# Patient Record
Sex: FEMALE | Race: WHITE | NOT HISPANIC OR LATINO | Employment: UNEMPLOYED | URBAN - METROPOLITAN AREA
[De-identification: names, ages, dates, MRNs, and addresses within clinical notes are randomized per-mention and may not be internally consistent; named-entity substitution may affect disease eponyms.]

---

## 2023-08-09 DIAGNOSIS — K21.9 CHRONIC GASTROESOPHAGEAL REFLUX DISEASE: Primary | ICD-10-CM

## 2023-08-09 PROBLEM — E55.9 VITAMIN D DEFICIENCY: Status: ACTIVE | Noted: 2023-08-09

## 2023-08-09 PROBLEM — N89.8 VAGINAL DRYNESS: Status: ACTIVE | Noted: 2023-08-09

## 2023-08-09 PROBLEM — N92.6 IRREGULAR BLEEDING: Status: ACTIVE | Noted: 2023-08-09

## 2023-08-09 RX ORDER — D-METHORPHAN/PE/ACETAMINOPHEN 10-5-325MG
CAPSULE ORAL
COMMUNITY

## 2023-08-09 RX ORDER — PANTOPRAZOLE SODIUM 20 MG/1
20 TABLET, DELAYED RELEASE ORAL DAILY
Qty: 90 TABLET | Refills: 1 | Status: SHIPPED | OUTPATIENT
Start: 2023-08-09 | End: 2023-08-10

## 2023-08-09 RX ORDER — ERGOCALCIFEROL 1.25 MG/1
1 CAPSULE ORAL WEEKLY
COMMUNITY
Start: 2022-02-10

## 2023-08-09 RX ORDER — PANTOPRAZOLE SODIUM 20 MG/1
1 TABLET, DELAYED RELEASE ORAL DAILY
COMMUNITY
Start: 2017-10-28 | End: 2023-08-09 | Stop reason: SDUPTHER

## 2023-08-09 RX ORDER — MEDROXYPROGESTERONE ACETATE 150 MG/ML
INJECTION, SUSPENSION INTRAMUSCULAR
COMMUNITY
Start: 2017-10-17

## 2023-08-09 RX ORDER — AMOXICILLIN AND CLAVULANATE POTASSIUM 600; 42.9 MG/5ML; MG/5ML
POWDER, FOR SUSPENSION ORAL
COMMUNITY
Start: 2023-03-10

## 2023-08-10 DIAGNOSIS — K21.9 CHRONIC GASTROESOPHAGEAL REFLUX DISEASE: ICD-10-CM

## 2023-08-10 RX ORDER — PANTOPRAZOLE SODIUM 20 MG/1
20 TABLET, DELAYED RELEASE ORAL DAILY
Qty: 90 TABLET | Refills: 1 | Status: SHIPPED | OUTPATIENT
Start: 2023-08-10 | End: 2024-02-08

## 2023-12-18 ENCOUNTER — PROCEDURE VISIT (OUTPATIENT)
Dept: OBSTETRICS AND GYNECOLOGY | Facility: CLINIC | Age: 48
End: 2023-12-18

## 2023-12-18 VITALS
DIASTOLIC BLOOD PRESSURE: 66 MMHG | HEIGHT: 57 IN | BODY MASS INDEX: 15.3 KG/M2 | SYSTOLIC BLOOD PRESSURE: 99 MMHG | WEIGHT: 70.9 LBS

## 2023-12-18 DIAGNOSIS — Z30.42 ENCOUNTER FOR SURVEILLANCE OF INJECTABLE CONTRACEPTIVE: ICD-10-CM

## 2023-12-18 PROCEDURE — 99212 OFFICE O/P EST SF 10 MIN: CPT | Performed by: OBSTETRICS & GYNECOLOGY

## 2023-12-18 PROCEDURE — 96372 THER/PROPH/DIAG INJ SC/IM: CPT | Performed by: OBSTETRICS & GYNECOLOGY

## 2023-12-18 RX ORDER — MEDROXYPROGESTERONE ACETATE 150 MG/ML
150 INJECTION, SUSPENSION INTRAMUSCULAR ONCE
Status: COMPLETED | OUTPATIENT
Start: 2023-12-18 | End: 2023-12-18

## 2023-12-18 RX ADMIN — MEDROXYPROGESTERONE ACETATE 150 MG: 150 INJECTION, SUSPENSION INTRAMUSCULAR at 14:29

## 2024-02-08 DIAGNOSIS — K21.9 CHRONIC GASTROESOPHAGEAL REFLUX DISEASE: ICD-10-CM

## 2024-02-08 RX ORDER — METHYLPREDNISOLONE 4 MG/1
TABLET ORAL
COMMUNITY
Start: 2023-08-20

## 2024-02-08 RX ORDER — PANTOPRAZOLE SODIUM 20 MG/1
20 TABLET, DELAYED RELEASE ORAL DAILY
Qty: 90 TABLET | Refills: 1 | Status: SHIPPED | OUTPATIENT
Start: 2024-02-08

## 2024-02-08 RX ORDER — AZITHROMYCIN 250 MG/1
TABLET, FILM COATED ORAL
COMMUNITY
Start: 2023-08-20

## 2024-03-11 ENCOUNTER — APPOINTMENT (OUTPATIENT)
Dept: OBSTETRICS AND GYNECOLOGY | Facility: CLINIC | Age: 49
End: 2024-03-11

## 2024-03-14 ENCOUNTER — PROCEDURE VISIT (OUTPATIENT)
Dept: OBSTETRICS AND GYNECOLOGY | Facility: CLINIC | Age: 49
End: 2024-03-14

## 2024-03-14 VITALS
BODY MASS INDEX: 14.48 KG/M2 | SYSTOLIC BLOOD PRESSURE: 111 MMHG | HEIGHT: 57 IN | WEIGHT: 67.1 LBS | DIASTOLIC BLOOD PRESSURE: 77 MMHG

## 2024-03-14 DIAGNOSIS — Z30.42 ENCOUNTER FOR SURVEILLANCE OF INJECTABLE CONTRACEPTIVE: ICD-10-CM

## 2024-03-14 PROCEDURE — 96372 THER/PROPH/DIAG INJ SC/IM: CPT | Performed by: OBSTETRICS & GYNECOLOGY

## 2024-03-14 RX ORDER — MEDROXYPROGESTERONE ACETATE 150 MG/ML
150 INJECTION, SUSPENSION INTRAMUSCULAR ONCE
Status: COMPLETED | OUTPATIENT
Start: 2024-03-14 | End: 2024-03-14

## 2024-03-14 RX ADMIN — MEDROXYPROGESTERONE ACETATE 150 MG: 150 INJECTION, SUSPENSION INTRAMUSCULAR at 15:06

## 2024-06-07 ENCOUNTER — APPOINTMENT (OUTPATIENT)
Dept: OBSTETRICS AND GYNECOLOGY | Facility: CLINIC | Age: 49
End: 2024-06-07

## 2024-06-10 ENCOUNTER — PROCEDURE VISIT (OUTPATIENT)
Dept: OBSTETRICS AND GYNECOLOGY | Facility: CLINIC | Age: 49
End: 2024-06-10

## 2024-06-10 VITALS
HEIGHT: 57 IN | SYSTOLIC BLOOD PRESSURE: 114 MMHG | WEIGHT: 63.1 LBS | BODY MASS INDEX: 13.61 KG/M2 | DIASTOLIC BLOOD PRESSURE: 69 MMHG

## 2024-06-10 DIAGNOSIS — Z30.42 ENCOUNTER FOR SURVEILLANCE OF INJECTABLE CONTRACEPTIVE: ICD-10-CM

## 2024-06-10 PROCEDURE — 96372 THER/PROPH/DIAG INJ SC/IM: CPT | Performed by: OBSTETRICS & GYNECOLOGY

## 2024-06-10 RX ORDER — MEDROXYPROGESTERONE ACETATE 150 MG/ML
150 INJECTION, SUSPENSION INTRAMUSCULAR ONCE
Status: COMPLETED | OUTPATIENT
Start: 2024-06-10 | End: 2024-06-10

## 2024-06-10 RX ADMIN — MEDROXYPROGESTERONE ACETATE 150 MG: 150 INJECTION, SUSPENSION INTRAMUSCULAR at 15:20

## 2024-06-10 NOTE — PROGRESS NOTES
Patient here for next depo injection--pt supply  Rt GM was swabbed with alcohol and allowed to dry  Depo was administered IM without complications--pt tolerated well  Next inj due-8/26-9/9/24

## 2024-09-03 ENCOUNTER — APPOINTMENT (OUTPATIENT)
Dept: OBSTETRICS AND GYNECOLOGY | Facility: CLINIC | Age: 49
End: 2024-09-03

## 2024-09-03 VITALS
BODY MASS INDEX: 13.5 KG/M2 | SYSTOLIC BLOOD PRESSURE: 116 MMHG | WEIGHT: 62.6 LBS | HEIGHT: 57 IN | DIASTOLIC BLOOD PRESSURE: 76 MMHG

## 2024-09-03 DIAGNOSIS — N95.2 ATROPHIC VULVOVAGINITIS: ICD-10-CM

## 2024-09-03 DIAGNOSIS — Z01.419 ENCOUNTER FOR ANNUAL ROUTINE GYNECOLOGICAL EXAMINATION: Primary | ICD-10-CM

## 2024-09-03 DIAGNOSIS — Z12.31 ENCOUNTER FOR SCREENING MAMMOGRAM FOR MALIGNANT NEOPLASM OF BREAST: ICD-10-CM

## 2024-09-03 DIAGNOSIS — Z30.42 ENCOUNTER FOR SURVEILLANCE OF INJECTABLE CONTRACEPTIVE: ICD-10-CM

## 2024-09-03 PROCEDURE — 96372 THER/PROPH/DIAG INJ SC/IM: CPT | Performed by: OBSTETRICS & GYNECOLOGY

## 2024-09-03 PROCEDURE — 99396 PREV VISIT EST AGE 40-64: CPT | Performed by: OBSTETRICS & GYNECOLOGY

## 2024-09-03 PROCEDURE — 3008F BODY MASS INDEX DOCD: CPT | Performed by: OBSTETRICS & GYNECOLOGY

## 2024-09-03 RX ORDER — ESTRADIOL 0.1 MG/G
CREAM VAGINAL
Qty: 42 G | Refills: 3 | Status: SHIPPED | OUTPATIENT
Start: 2024-09-03

## 2024-09-03 RX ORDER — MEDROXYPROGESTERONE ACETATE 150 MG/ML
150 INJECTION, SUSPENSION INTRAMUSCULAR ONCE
Status: COMPLETED | OUTPATIENT
Start: 2024-09-03 | End: 2024-09-03

## 2024-09-03 NOTE — PROGRESS NOTES
Subjective   Patient ID: Regina Rodrigez is a 49 y.o. female who presents for Annual Exam (Patient here for annual /Birth control= depo/Pt would like chaperone present for exam-Marlena Medina CMA is present) and Injections.  HPI  49-year-old  2 para 2 whose had 2  sections.  Patient has had amenorrhea x 6 years is currently on Depo-Provera for birth control she admits to regular bowel movements denies any urinary symptoms.  Last normal Pap and HPV 2020.  Patient states she has not had a mammogram since age 37 I have been giving her requisitions but she prefers not to have them at this time.  She said she may try this year.  Does complain of occasional hot flashes and vaginal dryness.  Review of Systems    Objective   Physical Exam  Gen.: Alert and in no acute distress. Well-developed, well-nourished.  Thyroid: Nonenlarged and no palpable thyroid nodules  Cardiovascular: Heart regular rate and rhythm  Pulmonary: Clear bilateral breath sounds  Breasts: Normal appearance, no nipple discharge and no skin changes. No palpable masses and no axillary lymphadenopathy  Abdomen: Soft and nontender, no abdominal mass palpated, no organomegaly   Pelvic: External genitalia normal, Bartholin's urethral and Loleta's glands normal. Vagina normal. Cervix normal. Uterus normal size and shape. Right adnexa normal without masses. Left adnexa normal without masses. Perianal area and normal.  Assessment/Plan   Annual GYN exam, Pap and HPV not needed this year, patient given a requisition for mammogram.  She and I discussed vaginal dryness at length.  Risk benefits and alternatives to vaginal estrogen cream discussed she agreed electronically sent to pharmacy on file.         Ba Quintana MD 24 3:43 PM

## 2024-09-03 NOTE — PROGRESS NOTES
Patient here for next depo injection--pt supply  Left gm was swabbed with alcohol and allowed to dry  Depo was administered IM without complications  Pt tolerated well   Next inj due-11/19-12/3

## 2024-11-20 DIAGNOSIS — Z30.8 ENCOUNTER FOR OTHER CONTRACEPTIVE MANAGEMENT: Primary | ICD-10-CM

## 2024-11-20 RX ORDER — MEDROXYPROGESTERONE ACETATE 150 MG/ML
150 INJECTION, SUSPENSION INTRAMUSCULAR
Qty: 1 ML | Refills: 3 | Status: SHIPPED | OUTPATIENT
Start: 2024-11-20

## 2024-11-25 ENCOUNTER — APPOINTMENT (OUTPATIENT)
Dept: OBSTETRICS AND GYNECOLOGY | Facility: CLINIC | Age: 49
End: 2024-11-25
Payer: MEDICAID

## 2024-11-25 VITALS
SYSTOLIC BLOOD PRESSURE: 100 MMHG | HEIGHT: 57 IN | DIASTOLIC BLOOD PRESSURE: 63 MMHG | BODY MASS INDEX: 13.25 KG/M2 | WEIGHT: 61.4 LBS

## 2024-11-25 DIAGNOSIS — Z30.42 ENCOUNTER FOR SURVEILLANCE OF INJECTABLE CONTRACEPTIVE: ICD-10-CM

## 2024-11-25 PROCEDURE — 96372 THER/PROPH/DIAG INJ SC/IM: CPT | Performed by: OBSTETRICS & GYNECOLOGY

## 2024-11-25 RX ORDER — MEDROXYPROGESTERONE ACETATE 150 MG/ML
150 INJECTION, SUSPENSION INTRAMUSCULAR ONCE
Status: COMPLETED | OUTPATIENT
Start: 2024-11-25 | End: 2024-11-25

## 2024-11-25 NOTE — PROGRESS NOTES
Patient here for next depo inj--pt supply  Rt gm was swabbed with alcohol and allowed to dry  Depo was administered IM without complications--pt tolerated well

## 2025-01-24 ENCOUNTER — OFFICE VISIT (OUTPATIENT)
Dept: URGENT CARE | Age: 50
End: 2025-01-24
Payer: MEDICAID

## 2025-01-24 VITALS
DIASTOLIC BLOOD PRESSURE: 75 MMHG | TEMPERATURE: 99.3 F | RESPIRATION RATE: 17 BRPM | BODY MASS INDEX: 15.12 KG/M2 | OXYGEN SATURATION: 98 % | HEIGHT: 59 IN | SYSTOLIC BLOOD PRESSURE: 107 MMHG | HEART RATE: 116 BPM | WEIGHT: 75 LBS

## 2025-01-24 DIAGNOSIS — R09.81 NASAL CONGESTION: ICD-10-CM

## 2025-01-24 DIAGNOSIS — J11.1 INFLUENZA: Primary | ICD-10-CM

## 2025-01-24 LAB
POC RAPID INFLUENZA A: POSITIVE
POC RAPID INFLUENZA B: NEGATIVE

## 2025-01-24 ASSESSMENT — PATIENT HEALTH QUESTIONNAIRE - PHQ9
SUM OF ALL RESPONSES TO PHQ9 QUESTIONS 1 AND 2: 0
1. LITTLE INTEREST OR PLEASURE IN DOING THINGS: NOT AT ALL
2. FEELING DOWN, DEPRESSED OR HOPELESS: NOT AT ALL

## 2025-01-24 NOTE — PATIENT INSTRUCTIONS
You tested positive for Influenza A. This is highly contagious, cover your mouth when you cough or sneeze. Wash hands frequently. Do not share foods or liquids. Wear a mask if you go in public    The flu causes symptoms such as fever, cough, body aches. The flu is a viral illness, in which antibiotics are not an effective therapy. Best way to treat the flu is to: stay home, rest, and drink plenty of fluids. Recommend acetaminophen (Tylenol) or ibuprofen (Advil) to relieve fever and aches. Most people with the flu get better on their own within 1 to 2 weeks. For any worsening symptoms (ie difficulty breathing, severe vomiting, confusion/really really bad headache), please contact your primary care provider or go to the emergency department for thorough evaluation.

## 2025-01-24 NOTE — PROGRESS NOTES
Subjective   Patient ID: Regina Rodrigez is a 49 y.o. female. They present today with a chief complaint of Vomiting (Nausea and vomiting started Tuesday and have gone away since ), Cough (Patient now has a cough and headaches and feel like its hard to take a deep breathe in ), and Nasal Congestion.    History of Present Illness  HPI  Pt is a 49 yr old female who presents with nausea and vomiting and diarrhea earlier in the week but able to keep down fluids now.  She has sinus congestion and fevers with body aches all week.  Flu A is going around her work.    Past Medical History  Allergies as of 01/24/2025    (No Known Allergies)       (Not in a hospital admission)       Past Medical History:   Diagnosis Date    Encounter for follow-up examination after completed treatment for conditions other than malignant neoplasm 09/08/2017    Postop check    Encounter for gynecological examination (general) (routine) without abnormal findings     Pap smear, as part of routine gynecological examination    Encounter for gynecological examination (general) (routine) without abnormal findings 03/15/2017    Well female exam with routine gynecological exam    Encounter for other specified special examinations     Encounter for urine test    Encounter for routine postpartum follow-up 10/17/2017    Postpartum exam    Encounter for supervision of other normal pregnancy, second trimester 07/03/2017    Prenatal care, subsequent pregnancy in second trimester    Encounter for supervision of other normal pregnancy, third trimester 08/21/2017    Prenatal care, subsequent pregnancy in third trimester    Personal history of other diseases of the female genital tract     History of abnormal cervical Papanicolaou smear    Personal history of other diseases of the female genital tract     History of amenorrhea    Personal history of other diseases of the female genital tract 03/15/2017    History of amenorrhea    Personal history of other diseases  "of the respiratory system 2018    History of acute bronchitis    Personal history of other infectious and parasitic diseases     History of chickenpox    Personal history of pneumonia (recurrent)     History of pneumonia    Supervision of elderly multigravida, unspecified trimester ([facility]) 2017    Advanced maternal age in multigravida    Supervision of high risk pregnancy, unspecified, unspecified trimester ([facility]) 2017    High-risk pregnancy    Urinary tract infection, site not specified 2017    Acute UTI    Urinary tract infection, site not specified 2017    Acute UTI       Past Surgical History:   Procedure Laterality Date     SECTION, CLASSIC  2015     Section        reports that she has been smoking cigarettes. She has never used smokeless tobacco.    Review of Systems  Review of Systems  Gen: No fatigue, +fever, sweats.  Head: +headache, no trauma.  Eyes: No vision loss, double vision, drainage, eye pain.  ENT: No hearing changes, pain, epistaxis, congestion  Cardiac: No chest pain  Pulmonary: No shortness of breath,  pleuritic pain,   Heme/lymph: No swollen glands  GI: No abdominal pain, +nausea, +vomiting, +diarrhea  : No  dysuria, frequency, urgency, hematuria  Musculoskeletal: No limb pain, joint pain, back pain, joint swelling or stiffness. + body aches  Skin: No rashes, pruritus, lumps, lesions.  Neuro: No Numbness, tingling, or weakness.  Psych: No  anxiety     Review of systems is otherwise negative unless stated above or in history of present illness.                             Objective    Vitals:    25 0950   BP: 107/75   BP Location: Left arm   Patient Position: Sitting   BP Cuff Size: Child   Pulse: (!) 116   Resp: 17   Temp: 37.4 °C (99.3 °F)   TempSrc: Oral   SpO2: 98%   Weight: (!) 34 kg (75 lb)   Height: 1.499 m (4' 11\")     No LMP recorded. Patient has had an injection.    Physical Exam  General: Vital signs stable slightly " tachy with slight fever, Pt is alert, no acute distress  Eyes: Conjunctiva normal, PERRL, EOMs intact  HENMT: Normocephalic, atraumatic, external ears and nose normal, no scars or masses.  No mastoid tenderness. Trachea is midline. No meningeal signs, negative Kernig and Brudzinski, moves neck freely.  No sinus tenderness  Resp: Respiratory effort is normal, no retractions, no stridor. Lungs CTA, no wheezes or rhonchi  CV: Heart is regular rate and rhythm.   Skin: No evidence of trauma, skin is warm and dry. No rashes, lesions or ulcers.  Skel: full range of motion of upper and lower extremities.   Neuro: Normal gait, CN II-XII intact, no motor or sensory changes.  Psych: Alert and oriented ×3, judgment is appropriate, normal mood and affect   Procedures    Point of Care Test & Imaging Results from this visit  Results for orders placed or performed in visit on 01/24/25   POCT Influenza A/B manually resulted   Result Value Ref Range    POC Rapid Influenza A Positive (A) Negative    POC Rapid Influenza B Negative Negative      No results found.    Diagnostic study results (if any) were reviewed by ANUP Singer.    Assessment/Plan   Allergies, medications, history, and pertinent labs/EKGs/Imaging reviewed by ANUP Singer.     Medical Decision Making  Patient's history and exam consistent with viral syndrome.  No indications for antibiotics at this time.  Flu A positive. No evidence of strep, pneumonia, otitis, bacterial sinusitis, other bacterial etiology, or sepsis.  Encouraged patient to continue supportive care measures and symptom management.  Advised to follow-up with primary care provider if symptoms persist, or return with any new or worsening symptoms. Patient agreeable with plan and verbalized understanding.         Orders and Diagnoses  Diagnoses and all orders for this visit:  Influenza  Nasal congestion  -     POCT Influenza A/B manually resulted      Medical Admin  Record      Patient disposition: Home    Electronically signed by ANUP Singer  11:49 AM

## 2025-01-24 NOTE — LETTER
January 24, 2025     Patient: Regina Rodrigez   YOB: 1975   Date of Visit: 1/24/2025       To Whom It May Concern:    It is my medical opinion that Regina Rodrigez should remain out of work until 1/27/2025 .    If you have any questions or concerns, please don't hesitate to call.         Sincerely,        Milady Key, APRN-CNP    CC: No Recipients

## 2025-02-03 ENCOUNTER — APPOINTMENT (OUTPATIENT)
Dept: OBSTETRICS AND GYNECOLOGY | Facility: CLINIC | Age: 50
End: 2025-02-03
Payer: MEDICAID

## 2025-02-14 ENCOUNTER — APPOINTMENT (OUTPATIENT)
Dept: OBSTETRICS AND GYNECOLOGY | Facility: CLINIC | Age: 50
End: 2025-02-14
Payer: MEDICAID

## 2025-02-14 VITALS
BODY MASS INDEX: 12.94 KG/M2 | SYSTOLIC BLOOD PRESSURE: 104 MMHG | HEIGHT: 59 IN | WEIGHT: 64.2 LBS | DIASTOLIC BLOOD PRESSURE: 66 MMHG

## 2025-02-14 DIAGNOSIS — Z30.42 ENCOUNTER FOR SURVEILLANCE OF INJECTABLE CONTRACEPTIVE: ICD-10-CM

## 2025-02-14 PROCEDURE — 96372 THER/PROPH/DIAG INJ SC/IM: CPT | Performed by: OBSTETRICS & GYNECOLOGY

## 2025-02-14 RX ORDER — MEDROXYPROGESTERONE ACETATE 150 MG/ML
150 INJECTION, SUSPENSION INTRAMUSCULAR ONCE
Status: COMPLETED | OUTPATIENT
Start: 2025-02-14 | End: 2025-02-14

## 2025-02-14 RX ADMIN — MEDROXYPROGESTERONE ACETATE 150 MG: 150 INJECTION, SUSPENSION INTRAMUSCULAR at 14:33

## 2025-02-14 NOTE — PROGRESS NOTES
Patient here  for next depo inj--pt supply  Left gm was swabbed with alcohol and allowed to dry  Depo was administered I'm without complications--pt tolerated well  Next inj due-5/2-5/16/25

## 2025-05-09 ENCOUNTER — APPOINTMENT (OUTPATIENT)
Dept: OBSTETRICS AND GYNECOLOGY | Facility: CLINIC | Age: 50
End: 2025-05-09
Payer: MEDICAID

## 2025-05-09 VITALS
BODY MASS INDEX: 13.79 KG/M2 | SYSTOLIC BLOOD PRESSURE: 107 MMHG | WEIGHT: 68.4 LBS | HEIGHT: 59 IN | DIASTOLIC BLOOD PRESSURE: 69 MMHG

## 2025-05-09 DIAGNOSIS — Z30.42 ENCOUNTER FOR SURVEILLANCE OF INJECTABLE CONTRACEPTIVE: ICD-10-CM

## 2025-05-09 PROCEDURE — 96372 THER/PROPH/DIAG INJ SC/IM: CPT | Performed by: OBSTETRICS & GYNECOLOGY

## 2025-05-09 RX ORDER — MEDROXYPROGESTERONE ACETATE 150 MG/ML
150 INJECTION, SUSPENSION INTRAMUSCULAR ONCE
Status: COMPLETED | OUTPATIENT
Start: 2025-05-09 | End: 2025-05-09

## 2025-05-09 RX ADMIN — MEDROXYPROGESTERONE ACETATE 150 MG: 150 INJECTION, SUSPENSION INTRAMUSCULAR at 14:47

## 2025-05-09 NOTE — PROGRESS NOTES
Patient here for next depo inj--pt supply  Rt GM was swabbed with alcohol and allowed to dry  Depo was administered IM without complications--pt tolerated well  Next inj due   7/24-8/1/25

## 2025-08-01 ENCOUNTER — APPOINTMENT (OUTPATIENT)
Dept: OBSTETRICS AND GYNECOLOGY | Facility: CLINIC | Age: 50
End: 2025-08-01
Payer: MEDICAID

## 2025-08-01 VITALS
HEIGHT: 59 IN | BODY MASS INDEX: 12.94 KG/M2 | WEIGHT: 64.2 LBS | DIASTOLIC BLOOD PRESSURE: 75 MMHG | SYSTOLIC BLOOD PRESSURE: 108 MMHG

## 2025-08-01 DIAGNOSIS — Z30.42 ENCOUNTER FOR SURVEILLANCE OF INJECTABLE CONTRACEPTIVE: ICD-10-CM

## 2025-08-01 DIAGNOSIS — Z30.8 ENCOUNTER FOR OTHER CONTRACEPTIVE MANAGEMENT: ICD-10-CM

## 2025-08-01 PROCEDURE — 96372 THER/PROPH/DIAG INJ SC/IM: CPT | Performed by: OBSTETRICS & GYNECOLOGY

## 2025-08-01 RX ORDER — MEDROXYPROGESTERONE ACETATE 150 MG/ML
150 INJECTION, SUSPENSION INTRAMUSCULAR ONCE
Status: COMPLETED | OUTPATIENT
Start: 2025-08-01 | End: 2025-08-01

## 2025-08-01 RX ORDER — MEDROXYPROGESTERONE ACETATE 150 MG/ML
150 INJECTION, SUSPENSION INTRAMUSCULAR
Qty: 1 ML | Refills: 0 | Status: SHIPPED | OUTPATIENT
Start: 2025-08-01

## 2025-08-01 RX ADMIN — MEDROXYPROGESTERONE ACETATE 150 MG: 150 INJECTION, SUSPENSION INTRAMUSCULAR at 14:01

## 2025-08-01 NOTE — PROGRESS NOTES
Patient here for next depo inj--pt supply  Let GM was swabbed with alcohol and allowed to dry--depo was administered IM without complications--pt tolerated well  Next inj due-10/17-10/31/25

## 2025-08-23 ENCOUNTER — OFFICE VISIT (OUTPATIENT)
Dept: URGENT CARE | Age: 50
End: 2025-08-23
Payer: MEDICAID

## 2025-08-23 ENCOUNTER — ANCILLARY PROCEDURE (OUTPATIENT)
Dept: URGENT CARE | Age: 50
End: 2025-08-23
Payer: MEDICAID

## 2025-08-23 VITALS
BODY MASS INDEX: 14.11 KG/M2 | HEIGHT: 59 IN | RESPIRATION RATE: 18 BRPM | OXYGEN SATURATION: 98 % | DIASTOLIC BLOOD PRESSURE: 86 MMHG | HEART RATE: 81 BPM | SYSTOLIC BLOOD PRESSURE: 128 MMHG | WEIGHT: 70 LBS | TEMPERATURE: 97.8 F

## 2025-08-23 DIAGNOSIS — M79.672 ACUTE FOOT PAIN, LEFT: Primary | ICD-10-CM

## 2025-08-23 DIAGNOSIS — M79.672 ACUTE FOOT PAIN, LEFT: ICD-10-CM

## 2025-08-23 PROCEDURE — 99213 OFFICE O/P EST LOW 20 MIN: CPT | Performed by: PHYSICIAN ASSISTANT

## 2025-08-23 PROCEDURE — 73630 X-RAY EXAM OF FOOT: CPT | Mod: LEFT SIDE | Performed by: PHYSICIAN ASSISTANT

## 2025-08-23 PROCEDURE — 3008F BODY MASS INDEX DOCD: CPT | Performed by: PHYSICIAN ASSISTANT

## 2025-08-23 ASSESSMENT — PATIENT HEALTH QUESTIONNAIRE - PHQ9
2. FEELING DOWN, DEPRESSED OR HOPELESS: NOT AT ALL
SUM OF ALL RESPONSES TO PHQ9 QUESTIONS 1 AND 2: 0
1. LITTLE INTEREST OR PLEASURE IN DOING THINGS: NOT AT ALL

## 2025-10-21 ENCOUNTER — APPOINTMENT (OUTPATIENT)
Dept: OBSTETRICS AND GYNECOLOGY | Facility: CLINIC | Age: 50
End: 2025-10-21
Payer: MEDICAID

## 2025-10-28 ENCOUNTER — APPOINTMENT (OUTPATIENT)
Dept: OBSTETRICS AND GYNECOLOGY | Facility: CLINIC | Age: 50
End: 2025-10-28
Payer: MEDICAID